# Patient Record
Sex: FEMALE | Race: WHITE | NOT HISPANIC OR LATINO
[De-identification: names, ages, dates, MRNs, and addresses within clinical notes are randomized per-mention and may not be internally consistent; named-entity substitution may affect disease eponyms.]

---

## 2017-03-29 ENCOUNTER — APPOINTMENT (OUTPATIENT)
Dept: OPHTHALMOLOGY | Facility: CLINIC | Age: 77
End: 2017-03-29

## 2017-03-29 DIAGNOSIS — L71.8 OTHER ROSACEA: ICD-10-CM

## 2017-03-29 RX ORDER — DOXYCYCLINE HYCLATE 50 MG/1
50 CAPSULE ORAL
Qty: 60 | Refills: 3 | Status: ACTIVE | COMMUNITY
Start: 2017-03-29 | End: 1900-01-01

## 2017-05-02 ENCOUNTER — APPOINTMENT (OUTPATIENT)
Dept: OPHTHALMOLOGY | Facility: CLINIC | Age: 77
End: 2017-05-02

## 2017-05-02 RX ORDER — OFLOXACIN 3 MG/ML
0.3 SOLUTION/ DROPS OPHTHALMIC
Qty: 5 | Refills: 1 | Status: ACTIVE | COMMUNITY
Start: 2017-05-02 | End: 1900-01-01

## 2017-06-02 ENCOUNTER — APPOINTMENT (OUTPATIENT)
Dept: OPHTHALMOLOGY | Facility: CLINIC | Age: 77
End: 2017-06-02

## 2017-08-15 ENCOUNTER — APPOINTMENT (OUTPATIENT)
Dept: OPHTHALMOLOGY | Facility: CLINIC | Age: 77
End: 2017-08-15

## 2022-12-05 ENCOUNTER — APPOINTMENT (OUTPATIENT)
Dept: PEDIATRIC ORTHOPEDIC SURGERY | Facility: CLINIC | Age: 82
End: 2022-12-05

## 2022-12-05 VITALS — BODY MASS INDEX: 19.29 KG/M2 | TEMPERATURE: 96.9 F | HEIGHT: 66 IN | WEIGHT: 120 LBS

## 2022-12-05 PROCEDURE — 99212 OFFICE O/P EST SF 10 MIN: CPT

## 2022-12-05 PROCEDURE — 73140 X-RAY EXAM OF FINGER(S): CPT

## 2022-12-05 NOTE — HISTORY OF PRESENT ILLNESS
[de-identified] : 82-year-old pleasant lady is seen today for evaluation of her right thumb.  She has noted over the past few months pain and swelling and stiffness which has affected her  strength.  No obvious injury no numbness or paresthesias..  Past history since last seen has been stable

## 2022-12-05 NOTE — PHYSICAL EXAM
[de-identified] : Exam today reveals she has obvious swelling and tenderness to the carpometacarpal joint with a positive grind test.  Her  strength is certainly affected by pain in this area.\par \par X-rays ordered and taken today of the right thumb reveal findings consistent with carpometacarpal arthritis.

## 2022-12-05 NOTE — ASSESSMENT
[FreeTextEntry1] : Impression: Painful carpometacarpal arthritis right thumb.\par \par This patient will be treated with a thumb spica splint and Tylenol with the potential for injection if she becomes very symptomatic

## 2023-04-25 ENCOUNTER — APPOINTMENT (OUTPATIENT)
Dept: PEDIATRIC ORTHOPEDIC SURGERY | Facility: CLINIC | Age: 83
End: 2023-04-25

## 2023-04-26 ENCOUNTER — APPOINTMENT (OUTPATIENT)
Dept: PEDIATRIC ORTHOPEDIC SURGERY | Facility: CLINIC | Age: 83
End: 2023-04-26
Payer: MEDICARE

## 2023-04-26 VITALS
DIASTOLIC BLOOD PRESSURE: 71 MMHG | WEIGHT: 120 LBS | TEMPERATURE: 96.4 F | SYSTOLIC BLOOD PRESSURE: 128 MMHG | HEIGHT: 66 IN | BODY MASS INDEX: 19.29 KG/M2

## 2023-04-26 PROCEDURE — 99213 OFFICE O/P EST LOW 20 MIN: CPT

## 2023-04-26 PROCEDURE — 99203 OFFICE O/P NEW LOW 30 MIN: CPT | Mod: 25

## 2023-04-26 PROCEDURE — 20600 DRAIN/INJ JOINT/BURSA W/O US: CPT

## 2023-04-26 NOTE — HISTORY OF PRESENT ILLNESS
[de-identified] : This 82-year-old returns she has had ongoing significant pain and swelling to the base of her right thumb she is requesting injection

## 2023-04-26 NOTE — ASSESSMENT
[FreeTextEntry1] : Impression: Carpometacarpal arthritis right thumb.\par \par I have injected the joint.  A thumb spica splint has been prescribed she will return on a as needed basis

## 2023-04-26 NOTE — PROCEDURE
[FreeTextEntry1] : Under sterile technique with a Betadine prep the carpometacarpal joint of the right thumb was injected from a dorsal approach with a 22-gauge needle with 40 mg of methylprednisolone and 1 cc of 1% lidocaine with a Band-Aid dressing applied at the conclusion this was tolerated without incident

## 2023-04-26 NOTE — PHYSICAL EXAM
[de-identified] : Her exam once again is consistent with carpometacarpal arthritis of the right thumb with significant soft tissue swelling no evidence to suggest infection

## 2024-03-08 ENCOUNTER — APPOINTMENT (OUTPATIENT)
Dept: PEDIATRIC ORTHOPEDIC SURGERY | Facility: CLINIC | Age: 84
End: 2024-03-08
Payer: MEDICARE

## 2024-03-08 VITALS
WEIGHT: 120 LBS | SYSTOLIC BLOOD PRESSURE: 158 MMHG | HEIGHT: 65 IN | TEMPERATURE: 97 F | BODY MASS INDEX: 19.99 KG/M2 | DIASTOLIC BLOOD PRESSURE: 62 MMHG

## 2024-03-08 DIAGNOSIS — M18.11 UNILATERAL PRIMARY OSTEOARTHRITIS OF FIRST CARPOMETACARPAL JOINT, RIGHT HAND: ICD-10-CM

## 2024-03-08 PROCEDURE — 99213 OFFICE O/P EST LOW 20 MIN: CPT | Mod: 25

## 2024-03-08 PROCEDURE — 20600 DRAIN/INJ JOINT/BURSA W/O US: CPT | Mod: F5,RT

## 2024-03-08 NOTE — PHYSICAL EXAM
[de-identified] : Exam reveals very painful motion to the carpometacarpal joint of the thumb with obvious swelling present.  No gross instability.

## 2024-03-08 NOTE — ASSESSMENT
[FreeTextEntry1] : Impression: Carpometacarpal arthritis right thumb.  She has been injected uneventfully she will use a thumb spica splint and Tylenol for discomfort return on a as needed basis

## 2024-03-08 NOTE — HISTORY OF PRESENT ILLNESS
[de-identified] : This 83-year-old returns with recurrent pain to the base of her right thumb quite significant she has been using her thumb spica splint.

## 2024-03-08 NOTE — PROCEDURE
[FreeTextEntry1] : Under sterile technique with a Betadine prep the carpometacarpal joint of the right thumb was injected with a 22-gauge needle with 40 mg of methylprednisolone and half cc of 1% lidocaine with a Band-Aid dressing applied at the conclusion this was tolerated without incident

## 2024-05-10 ENCOUNTER — APPOINTMENT (OUTPATIENT)
Dept: PEDIATRIC ORTHOPEDIC SURGERY | Facility: CLINIC | Age: 84
End: 2024-05-10
Payer: MEDICARE

## 2024-05-10 VITALS
TEMPERATURE: 96.6 F | WEIGHT: 120 LBS | BODY MASS INDEX: 19.29 KG/M2 | HEIGHT: 66 IN | DIASTOLIC BLOOD PRESSURE: 78 MMHG | SYSTOLIC BLOOD PRESSURE: 136 MMHG

## 2024-05-10 DIAGNOSIS — M17.11 UNILATERAL PRIMARY OSTEOARTHRITIS, RIGHT KNEE: ICD-10-CM

## 2024-05-10 DIAGNOSIS — M11.261 OTHER CHONDROCALCINOSIS, RIGHT KNEE: ICD-10-CM

## 2024-05-10 PROCEDURE — 20610 DRAIN/INJ JOINT/BURSA W/O US: CPT | Mod: RT

## 2024-05-10 PROCEDURE — 99213 OFFICE O/P EST LOW 20 MIN: CPT | Mod: 25

## 2024-05-10 PROCEDURE — 73562 X-RAY EXAM OF KNEE 3: CPT | Mod: RT

## 2024-05-10 RX ORDER — TIZANIDINE 2 MG/1
2 TABLET ORAL
Qty: 40 | Refills: 1 | Status: ACTIVE | COMMUNITY
Start: 2024-05-10 | End: 1900-01-01

## 2024-05-10 NOTE — PHYSICAL EXAM
[de-identified] : Exam today reveals she has very slight antalgic component to her gait on the right side she has good motion to the lumbar spine as well as the right hip very mild restriction of motion to the right hip without discomfort.  She has no tenderness about the lumbar segment posterior wall of the pelvis anterior hip capsule trochanter or thigh.  The right knee there is a moderate effusion there is crepitus on motion with mild restriction of full flexion no instability on stress.  Straight leg raising is negative neurologically she is intact.  X-rays ordered and taken today of the right knee reveal significant degenerative change with chondrocalcinosis
Patient's belongings returned

## 2024-05-10 NOTE — HISTORY OF PRESENT ILLNESS
[de-identified] : This 83-year-old is seen today for evaluation of the right lower extremity.  History noted she recently underwent cardiac ablation for atrial fibrillation.  This was uneventful she continues on Eliquis.  She has had recent pain in the right lower extremity mainly in the knee and thigh with no obvious trauma precipitating event this causes discomfort mainly at night and she cannot get a good night sleep.  During the day she is able to function reasonably well.  She has not had any obvious injury recent illness or fever

## 2024-05-10 NOTE — ASSESSMENT
[FreeTextEntry1] : Impression: Symptomatic arthritis right knee.  The knee has been injected.  To help with her mild myalgias in the distal thigh as well as hopefully help with sleep I have prescribed tizanidine only to be taken at night just before sleep.  She will return if she is not progressing.  Nonsteroidals will not be prescribed because of the Eliquis.

## 2024-06-05 ENCOUNTER — APPOINTMENT (OUTPATIENT)
Dept: PEDIATRIC ORTHOPEDIC SURGERY | Facility: CLINIC | Age: 84
End: 2024-06-05
Payer: MEDICARE

## 2024-06-05 VITALS — TEMPERATURE: 96.6 F | WEIGHT: 120 LBS | HEIGHT: 66 IN | BODY MASS INDEX: 19.29 KG/M2

## 2024-06-05 DIAGNOSIS — M25.551 PAIN IN RIGHT HIP: ICD-10-CM

## 2024-06-05 DIAGNOSIS — M70.61 TROCHANTERIC BURSITIS, RIGHT HIP: ICD-10-CM

## 2024-06-05 PROCEDURE — 73502 X-RAY EXAM HIP UNI 2-3 VIEWS: CPT | Mod: RT

## 2024-06-05 PROCEDURE — 99212 OFFICE O/P EST SF 10 MIN: CPT

## 2024-06-05 RX ORDER — TIZANIDINE 2 MG/1
2 TABLET ORAL
Qty: 40 | Refills: 1 | Status: ACTIVE | COMMUNITY
Start: 2024-06-05 | End: 1900-01-01

## 2024-06-05 NOTE — PHYSICAL EXAM
[de-identified] : Her exam today she has an obvious antalgic gait on the right side.  She has reasonable motion to the lumbar spine without spasm or tenderness present.  There is good motion to the right hip as well appropriate for her age she has tenderness in the groin as well as over the trochanter though not terribly objective reproducibly.  No obvious tenderness over the symphysis or ischial tuberosity she is able to straight leg raise and to abduct against gravity.  There is no tenderness to the thigh itself or to the knee.  She is neurologically intact.  X-rays ordered and taken today of the right hip reveal mild degenerative changes no obvious fractures.

## 2024-06-05 NOTE — HISTORY OF PRESENT ILLNESS
[de-identified] : This 84-year-old returns today for evaluation of her right hip region.  She states she noted acute onset of pain in the right hip pelvic region just about 5-7 days with no obvious traumatic or precipitating event.  Her pain was quite intense and she has significant difficulty bearing weight.  Over the past 1-2 days she has been able to walk using a cane in the left hand with difficulty.  She has not noted any obvious swelling or redness.  No back pain numbness or paresthesias or pain does not radiate distally.  She has not had any recent illness/fever.  It is to be noted she is on Eliquis for atrial fibrillation.  She did have a recent DEXA scan which revealed osteopenia

## 2024-06-05 NOTE — ASSESSMENT
[FreeTextEntry1] : Impression: Pain right hip rule out occult insufficiency type fracture.  I have advised restricted activities use of a cane in her left hand with gentle motion exercises at home no prolonged ambulation.  If she continues to be symptomatic a CT scan of the pelvis to rule out occult fracture  May become necessary

## 2024-07-01 ENCOUNTER — APPOINTMENT (OUTPATIENT)
Dept: PEDIATRIC ORTHOPEDIC SURGERY | Facility: CLINIC | Age: 84
End: 2024-07-01

## 2024-07-31 ENCOUNTER — APPOINTMENT (OUTPATIENT)
Dept: PEDIATRIC ORTHOPEDIC SURGERY | Facility: CLINIC | Age: 84
End: 2024-07-31

## 2024-08-04 ENCOUNTER — NON-APPOINTMENT (OUTPATIENT)
Age: 84
End: 2024-08-04

## 2024-08-05 ENCOUNTER — APPOINTMENT (OUTPATIENT)
Dept: PEDIATRIC ORTHOPEDIC SURGERY | Facility: CLINIC | Age: 84
End: 2024-08-05

## 2024-08-05 PROBLEM — M11.261 CHONDROCALCINOSIS OF RIGHT KNEE DUE TO PYROPHOSPHATE CRYSTALS: Status: ACTIVE | Noted: 2024-08-05

## 2024-08-05 PROCEDURE — 20610 DRAIN/INJ JOINT/BURSA W/O US: CPT | Mod: RT

## 2024-08-05 PROCEDURE — 73562 X-RAY EXAM OF KNEE 3: CPT | Mod: RT

## 2024-08-05 PROCEDURE — 99213 OFFICE O/P EST LOW 20 MIN: CPT | Mod: 25

## 2024-08-05 NOTE — ASSESSMENT
[FreeTextEntry1] : Impression: Degenerative joint disease with chondrocalcinosis right knee.  The knee has been injected uneventfully she will return on a as needed basis

## 2024-08-05 NOTE — HISTORY OF PRESENT ILLNESS
[de-identified] : This 84-year-old is seen for evaluation of her right knee.  Over the past 3-4 weeks she has noted acute onset of pain stiffness and difficulty bearing weight progressively up until the present without obvious traumatic or precipitating event.  No locking popping or sensation of instability.  She has been using a cane to aid in her ambulation.

## 2024-08-05 NOTE — PHYSICAL EXAM
[de-identified] : Examination today reveals an antalgic gait with limp she has painful motion to the knee slight restriction of full flexion as well as extension.  No instability on stress of the cruciate/collateral ligaments she does have pain mainly in the medial compartment and patellofemoral space.  Popliteal fossa reveals a well-circumscribed mass with the characteristics of a Bakers cyst.  Neurovascular status is intact.  X-rays ordered and taken today of the right knee standing with a Gonzalez view reveal moderate degenerative joint disease with chondrocalcinosis no loose body

## 2024-08-12 ENCOUNTER — APPOINTMENT (OUTPATIENT)
Dept: PEDIATRIC ORTHOPEDIC SURGERY | Facility: CLINIC | Age: 84
End: 2024-08-12
Payer: MEDICARE

## 2024-08-12 VITALS — SYSTOLIC BLOOD PRESSURE: 157 MMHG | HEART RATE: 58 BPM | DIASTOLIC BLOOD PRESSURE: 73 MMHG

## 2024-08-12 VITALS — HEIGHT: 66 IN | WEIGHT: 120 LBS | TEMPERATURE: 97 F | BODY MASS INDEX: 19.29 KG/M2

## 2024-08-12 DIAGNOSIS — M79.18 MYALGIA, OTHER SITE: ICD-10-CM

## 2024-08-12 DIAGNOSIS — M54.16 RADICULOPATHY, LUMBAR REGION: ICD-10-CM

## 2024-08-12 PROCEDURE — 99213 OFFICE O/P EST LOW 20 MIN: CPT | Mod: 25

## 2024-08-12 PROCEDURE — 20552 NJX 1/MLT TRIGGER POINT 1/2: CPT

## 2024-08-12 NOTE — PHYSICAL EXAM
[de-identified] : Exam reveals an antalgic gait with limp on the right side she has restricted motion in the lumbar segment in all planes.  Spasm and tenderness more so on the right side is noted there is an obvious trigger point in the region of the SI joint on the right side.  The right hip is moving well with no obvious instability or irritability.  She is able to straight leg raise and abduct the hip against gravity with discomfort.  She is neurologically stable.  X-rays ordered and taken today of the lumbar spine reveal multilevel significant degenerative disc space narrowing spondylosis.  Formation and facet arthritis.  There is also noted to be sclerosis and narrowing of the right SI joint.  No lesion

## 2024-08-12 NOTE — HISTORY OF PRESENT ILLNESS
[de-identified] : This 84-year-old returns she is doing better with regards to the right knee following injection however she has been plagued by persistent pain in the right buttock hip region.  She has been using her cane though is quite symptomatic no numbness paresthesias motor weakness bladder bowel dysfunction

## 2024-08-12 NOTE — ASSESSMENT
[FreeTextEntry1] : Impression: Lumbar radiculitis/myalgias.  I have injected the trigger point I have referred this patient for formal physical therapy.  Nonsteroidals cannot be prescribed as she is on anticoagulants she will return on a as needed basis